# Patient Record
Sex: FEMALE | Race: WHITE
[De-identification: names, ages, dates, MRNs, and addresses within clinical notes are randomized per-mention and may not be internally consistent; named-entity substitution may affect disease eponyms.]

---

## 2018-06-15 NOTE — CT
ABDOMEN AND PELVIC CT SCAN WITH IV CONTRAST:

 

HISTORY: 

Abdominal pain.  History of Crohn's disease.

 

FINDINGS: 

Lung bases are clear.  The liver, gallbladder, pancreas, spleen, and adrenal glands are unremarkable.
  There is a small hiatal hernia.  There is some minimal free intraperitoneal fluid overlying the ana
er and in the subhepatic space as well as in the pelvis.  There are abnormally dilated loops of small
 bowel, particularly in the mid small bowel with some air and fluid levels.  There is a long segment 
of abnormal distal ileal bowel wall thickening and enhancement, certainly evidence for Crohn's diseas
e.  This extends up to the level of the terminal ileum.  No CT evidence for acute appendicitis.  The 
visualized colon appears unremarkable.  No evidence for abscess or perforation identified.

 

IMPRESSION: 

Evidence for Crohn's disease involving a long segment of the distal ileum with proximal small bowel o
bstruction without evidence for abscess or perforation.  No CT evidence for acute appendicitis.  Free
 intraperitoneal fluid within the pelvis, and subhepatic space, and overlying the liver.  Small hiata
l hernia.  No renal calculus or  obstruction.  The uterus appears unremarkable.  Adnexa are somewha
t obscured by the dilated abnormal small bowel.

 

POS: [facility]

## 2018-12-19 ENCOUNTER — HOSPITAL ENCOUNTER (OUTPATIENT)
Dept: HOSPITAL 92 - CT | Age: 28
Discharge: HOME | End: 2018-12-19
Payer: COMMERCIAL

## 2018-12-19 DIAGNOSIS — K56.699: ICD-10-CM

## 2018-12-19 DIAGNOSIS — K50.00: Primary | ICD-10-CM

## 2018-12-19 LAB
PREGS CONTROL BACKGROUND?: (no result)
PREGS CONTROL BAR APPEAR?: YES

## 2018-12-19 PROCEDURE — 36415 COLL VENOUS BLD VENIPUNCTURE: CPT

## 2018-12-19 PROCEDURE — 84703 CHORIONIC GONADOTROPIN ASSAY: CPT

## 2018-12-19 PROCEDURE — 74178 CT ABD&PLV WO CNTR FLWD CNTR: CPT

## 2018-12-19 NOTE — CT
CT ABDOMEN AND PELVIS WITH AND WITHOUT CONTRAST:

 

Date:  12/19/18 

 

HISTORY:  

K50.9 Crohn's disease of small bowel. 

 

COMPARISON:  

CT dated 06/15/18. 

 

FINDINGS:

Lung bases are clear. No pericardial effusion. 

 

On the noncontrast portion of the examination, there is no nephroureterolithiasis or hydroureteroneph
rosis. No secondary evidence of a recently passed stone. 

 

There has been marked interval improvement of the mucosal hyperenhancement relative to the comparison
 examination. There are two areas of stricturing of the distal ileum which are similar to the compari
son examination. On axial image 515 is a stricture measuring 36 mm with a proximal hyperdense indwell
ing object which may represent an ingested pill. Distal to this is another 3 cm stricture. Length of 
this stricture is similar to the comparison examination. 

 

No areas of mucosal hyperenhancement to suggest active current Crohn's disease. There is no dilatatio
n of the large bowel. The aortoiliac contour is nonaneurysmal. The liver, gallbladder, kidneys, adren
al glands, and spleen are all unremarkable. 

 

The skeleton is unremarkable. No evidence of penetrating disease or mesenteric inflammation. 

 

No perianal abscess is appreciated. 

 

 

IMPRESSION: 

 

Two separate areas of disease of the distal ileum with strictures. No active acute hyperenhancement t
o suggest active inflammation. No penetrating complications, perianal disease, or perianal fistula. N
o evidence of extraintestinal complications. 

 

 

POS: [facility]